# Patient Record
Sex: MALE | URBAN - METROPOLITAN AREA
[De-identification: names, ages, dates, MRNs, and addresses within clinical notes are randomized per-mention and may not be internally consistent; named-entity substitution may affect disease eponyms.]

---

## 2018-10-01 ENCOUNTER — OFFICE VISIT (OUTPATIENT)
Dept: URBAN - METROPOLITAN AREA CLINIC 23 | Facility: CLINIC | Age: 73
End: 2018-10-01
Payer: COMMERCIAL

## 2018-10-01 DIAGNOSIS — H25.13 AGE-RELATED NUCLEAR CATARACT, BILATERAL: Chronic | ICD-10-CM

## 2018-10-01 DIAGNOSIS — H04.123 DRY EYE SYNDROME OF BILATERAL LACRIMAL GLANDS: Primary | ICD-10-CM

## 2018-10-01 DIAGNOSIS — Z83.511 FAMILY HISTORY OF GLAUCOMA: Chronic | ICD-10-CM

## 2018-10-01 DIAGNOSIS — H52.03 HYPERMETROPIA, BILATERAL: Chronic | ICD-10-CM

## 2018-10-01 PROCEDURE — 92014 COMPRE OPH EXAM EST PT 1/>: CPT | Performed by: OPTOMETRIST

## 2018-10-01 ASSESSMENT — VISUAL ACUITY
OS: 20/25
OD: 20/20

## 2018-10-01 ASSESSMENT — INTRAOCULAR PRESSURE
OD: 20
OS: 21

## 2018-10-01 NOTE — IMPRESSION/PLAN
Impression: Family history of glaucoma: Z83.511. Plan: Mother has glaucoma; normal/borderline IOP today; monitor.

## 2020-03-11 ENCOUNTER — OFFICE VISIT (OUTPATIENT)
Dept: URBAN - METROPOLITAN AREA CLINIC 23 | Facility: CLINIC | Age: 75
End: 2020-03-11
Payer: COMMERCIAL

## 2020-03-11 PROCEDURE — 92014 COMPRE OPH EXAM EST PT 1/>: CPT | Performed by: OPTOMETRIST

## 2020-03-11 ASSESSMENT — VISUAL ACUITY
OD: 20/20
OS: 20/20

## 2020-03-11 ASSESSMENT — INTRAOCULAR PRESSURE
OS: 18
OD: 18

## 2020-03-11 ASSESSMENT — KERATOMETRY
OD: 41.50
OS: 41.88

## 2020-03-11 NOTE — IMPRESSION/PLAN
Impression: Dry eye syndrome of bilateral lacrimal glands: H04.123. OU. Plan: Dry eyes account for the patient's complaints, minimally symptomatic. There is no evidence of permanent changes to the cornea. Continue ATs prn.